# Patient Record
Sex: MALE | Race: WHITE | URBAN - METROPOLITAN AREA
[De-identification: names, ages, dates, MRNs, and addresses within clinical notes are randomized per-mention and may not be internally consistent; named-entity substitution may affect disease eponyms.]

---

## 2017-02-17 ENCOUNTER — OFFICE VISIT (OUTPATIENT)
Dept: URGENT CARE | Facility: URGENT CARE | Age: 77
End: 2017-02-17
Payer: MEDICARE

## 2017-02-17 VITALS
OXYGEN SATURATION: 96 % | SYSTOLIC BLOOD PRESSURE: 106 MMHG | WEIGHT: 164 LBS | DIASTOLIC BLOOD PRESSURE: 66 MMHG | TEMPERATURE: 97.8 F | BODY MASS INDEX: 23.53 KG/M2 | HEART RATE: 80 BPM

## 2017-02-17 DIAGNOSIS — R05.9 COUGH: Primary | ICD-10-CM

## 2017-02-17 DIAGNOSIS — J10.1 INFLUENZA DUE TO INFLUENZA A VIRUS: ICD-10-CM

## 2017-02-17 LAB
FLUAV+FLUBV AG SPEC QL: ABNORMAL
FLUAV+FLUBV AG SPEC QL: ABNORMAL
SPECIMEN SOURCE: ABNORMAL

## 2017-02-17 PROCEDURE — 87804 INFLUENZA ASSAY W/OPTIC: CPT | Performed by: FAMILY MEDICINE

## 2017-02-17 PROCEDURE — 99213 OFFICE O/P EST LOW 20 MIN: CPT | Performed by: FAMILY MEDICINE

## 2017-02-17 RX ORDER — OSELTAMIVIR PHOSPHATE 75 MG/1
75 CAPSULE ORAL 2 TIMES DAILY
Qty: 10 CAPSULE | Refills: 0 | Status: SHIPPED | OUTPATIENT
Start: 2017-02-17 | End: 2017-05-22

## 2017-02-17 NOTE — NURSING NOTE
"Chief Complaint   Patient presents with     URI     x4 days.       Initial /66 (BP Location: Right arm, Patient Position: Chair, Cuff Size: Adult Large)  Pulse 80  Temp 97.8  F (36.6  C) (Oral)  Wt 164 lb (74.4 kg)  SpO2 96%  BMI 23.53 kg/m2 Estimated body mass index is 23.53 kg/(m^2) as calculated from the following:    Height as of 5/14/16: 5' 10\" (1.778 m).    Weight as of this encounter: 164 lb (74.4 kg).  Medication Reconciliation: complete   Radha Bello CMA      "

## 2017-02-17 NOTE — MR AVS SNAPSHOT
After Visit Summary   2/17/2017    Jose Wynne    MRN: [id number]           Patient Information     Date Of Birth          1940        Visit Information        Provider Department      2/17/2017 5:05 PM Mikel Salgado MD [facility]        Today's Diagnoses     Cough    -  1    Influenza due to influenza A virus          Care Instructions      Influenza  Influenza ( the flu ) is an infection that affects your respiratory tract (the mouth, nose, and lungs, and the passages between them). Unlike a cold, the flu can make you very ill. And it can lead to pneumonia, a serious lung infection. For some people, especially older adults, young children, and people with certain chronic conditions, the flu can have serious complications and even be fatal.  What Are the Risk Factors for the Flu?     Viruses that cause influenza spread through the air in droplets when someone who has the flu coughs, sneezes, laughs, or talks.   Anyone can get the flu. But you re more likely to become infected if you:    Have a weakened immune system.    Work in a health care setting where you may be exposed to flu germs.    Live or work with someone who has the flu.    Haven t received an annual flu shot.  How Does the Flu Spread?  The flu is caused by viruses. The viruses spread through the air in droplets when someone who has the flu coughs, sneezes, laughs, or talks. You can become infected when you inhale these viruses directly. You can also become infected when you touch a surface on which the droplets have landed and then transfer the germs to your eyes, nose, or mouth. Touching used tissues, or sharing utensils, drinking glasses, or a toothbrush with an infected person can expose you to flu viruses, too.  What Are the Symptoms of the Flu?  Flu symptoms tend to come on quickly and may last a few days to a few weeks. They include:    Fever usually higher than 101 F  (38.3 C) and chills    Sore throat and  headache    Dry cough    Runny nose    Tiredness and weakness    Muscle aches  Factors That Can Make Flu Worse  For some people, the flu can be very serious. The risk of complications is greater for:    Children under age 5.    Adults 65 years of age and older.    People with a chronic illness, such as diabetes or heart, kidney, or lung disease.    People who live in a nursing home or long-term care facility.   How Is the Flu Treated?  Influenza usually improves after 7 days or so. In some cases, your health care provider may prescribe an antiviral medication. This may help you get well sooner. For the medication to help, you need to take it as soon as possible (ideally within 48 hours) after your symptoms start. If you develop pneumonia or other serious illness, hospital care may be needed.  Easing Flu Symptoms    Drink lots of fluids such as water, juice, and warm soup. A good rule is to drink enough so that you urinate your normal amount.    Get plenty of rest.    Ask your health care provider what to take for fever and pain.    Call your provider if your fever rises over 101 F (38.3 C) or you become dizzy, lightheaded, or short of breath.  Taking Steps to Protect Others    Wash your hands often, especially after coughing or sneezing. Or, clean your hands with an alcohol-based hand  containing at least 60 percent alcohol.    Cough or sneeze into a tissue. Then throw the tissue away and wash your hands. If you don t have a tissue, cough and sneeze into the crook of your elbow.    Stay home until at least 24 hours after you no longer have a fever or chills. Be sure the fever isn t being hidden by fever-reducing medication.    Don t share food, utensils, drinking glasses, or a toothbrush with others.    Ask your health care provider if others in your household should receive antiviral medication to help them avoid infection.  How Can the Flu Be Prevented?    One of the best ways to avoid the flu is to get a  flu vaccination each year. Viruses that cause the flu change from year to year. For that reason, doctors recommend getting the flu vaccine each year, as soon as it's available in your area. The vaccine may be given as a shot or as a nasal spray. Your health care provider can tell you which vaccine is right for you.    Wash your hands often. Frequent handwashing is a proven way to help prevent infection.    Carry an alcohol-based hand gel containing at least 60 percent alcohol. Use it when you don t have access to soap and water. Then wash your hands as soon as you can.    Avoid touching your eyes, nose, and mouth.    At home and work, clean phones, computer keyboards, and toys often with disinfectant wipes.    If possible, avoid close contact with others who have the flu or symptoms of the flu.  Handwashing Tips  Handwashing is one of the best ways to prevent many common infections. If you re caring for or visiting someone with the flu, wash your hands each time you enter and leave the room. Follow these steps:    Use warm water and plenty of soap. Rub your hands together well.    Clean the whole hand, under your nails, between your fingers, and up the wrists.    Wash for at least 15 seconds.    Rinse, letting the water run down your fingers, not up your wrists.    Dry your hands well. Use a paper towel to turn off the faucet and open the door.  Using Alcohol-Based Hand   Alcohol-based hand  are also a good choice. Use them when you don t have access to soap and water. Follow these steps:    Squeeze about a tablespoon of gel into the palm of one hand.    Rub your hands together briskly, cleaning the backs of your hands, the palms, between your fingers, and up the wrists.    Rub until the gel is gone and your hands are completely dry.  Preventing Influenza in Healthcare Settings  The flu is a special concern for people in hospitals and long-term care facilities. To help prevent the spread of flu, many  hospitals and nursing homes take these steps:    Health care providers wash their hands or use an alcohol-based hand  before and after treating each patient.    People with the flu have private rooms and bathrooms or share a room with someone with the same infection.    High-risk patients who don t have the flu are encouraged to get the flu and pneumonia vaccines.    All health care workers are encouraged or required to get flu shots.        0457-4348 [employer]. [address] [place] [address], [place] [address]. All rights reserved. This information is not intended as a substitute for professional medical care. Always follow your healthcare professional's instructions.        Patient Education    Oseltamivir Phosphate Oral capsule    Oseltamivir Phosphate Oral suspension  Oseltamivir Phosphate Oral capsule  What is this medicine?  OSELTAMIVIR (os el PLAZA i vir) is an antiviral medicine. It is used to prevent and to treat some kinds of influenza or the flu. It will not work for colds or other viral infections.  This medicine may be used for other purposes; ask your health care provider or pharmacist if you have questions.  What should I tell my health care provider before I take this medicine?  They need to know if you have any of the following conditions:    heart disease    immune system problems    kidney disease    liver disease    lung disease    an unusual or allergic reaction to oseltamivir, other medicines, foods, dyes, or preservatives    pregnant or trying to get pregnant    breast-feeding  How should I use this medicine?  Take this medicine by mouth with a glass of water. Follow the directions on the prescription label. Start this medicine at the first sign of flu symptoms. You can take it with or without food. If it upsets your stomach, take it with food. Take your medicine at regular intervals. Do not take your medicine more often than directed. Take all of your medicine as directed even if  you think you are better. Do not skip doses or stop your medicine early.  Talk to your pediatrician regarding the use of this medicine in children. While this drug may be prescribed for children as young as 14 days for selected conditions, precautions do apply.  Overdosage: If you think you have taken too much of this medicine contact a poison control center or emergency room at once.  NOTE: This medicine is only for you. Do not share this medicine with others.  What if I miss a dose?  If you miss a dose, take it as soon as you remember. If it is almost time for your next dose (within 2 hours), take only that dose. Do not take double or extra doses.  What may interact with this medicine?  Interactions are not expected.  This list may not describe all possible interactions. Give your health care provider a list of all the medicines, herbs, non-prescription drugs, or dietary supplements you use. Also tell them if you smoke, drink alcohol, or use illegal drugs. Some items may interact with your medicine.  What should I watch for while using this medicine?  Visit your doctor or health care professional for regular check ups. Tell your doctor if your symptoms do not start to get better or if they get worse.  If you have the flu, you may be at an increased risk of developing seizures, confusion, or abnormal behavior. This occurs early in the illness, and more frequently in children and teens. These events are not common, but may result in accidental injury to the patient. Families and caregivers of patients should watch for signs of unusual behavior and contact a doctor or health care professional right away if the patient shows signs of unusual behavior.  This medicine is not a substitute for the flu shot. Talk to your doctor each year about an annual flu shot.  What side effects may I notice from receiving this medicine?  Side effects that you should report to your doctor or health care professional as soon as  possible:    allergic reactions like skin rash, itching or hives, swelling of the face, lips, or tongue    anxiety, confusion, unusual behavior    breathing problems    hallucination, loss of contact with reality    redness, blistering, peeling or loosening of the skin, including inside the mouth    seizures  Side effects that usually do not require medical attention (report to your doctor or health care professional if they continue or are bothersome):    cough    diarrhea    dizziness    headache    nausea, vomiting    stomach pain  This list may not describe all possible side effects. Call your doctor for medical advice about side effects. You may report side effects to FDA at 9-288-JBP-4481.  Where should I keep my medicine?  Keep out of the reach of children.  Store at room temperature between 15 and 30 degrees C (59 and 86 degrees F). Throw away any unused medicine after the expiration date.  NOTE:This sheet is a summary. It may not cover all possible information. If you have questions about this medicine, talk to your doctor, pharmacist, or health care provider. Copyright  2016 [employer]              Follow-ups after your visit        Who to contact     If you have questions or need follow up information about today's clinic visit or your schedule please contact [facility] directly at [phone].  Normal or non-critical lab and imaging results will be communicated to you by MyChart, letter or phone within 4 business days after the clinic has received the results. If you do not hear from us within 7 days, please contact the clinic through MyChart or phone. If you have a critical or abnormal lab result, we will notify you by phone as soon as possible.  Submit refill requests through [employer] or call your pharmacy and they will forward the refill request to us. Please allow 3 business days for your refill to be completed.          Additional Information About Your Visit        [employer]  "Information     [employer] lets you send messages to your doctor, view your test results, renew your prescriptions, schedule appointments and more. To sign up, go to [email or web address] . Click on \"Log in\" on the left side of the screen, which will take you to the Welcome page. Then click on \"Sign up Now\" on the right side of the page.     You will be asked to enter the access code listed below, as well as some personal information. Please follow the directions to create your username and password.     Your access code is: [email or web address]  Expires: 2017  6:00 PM     Your access code will  in 90 days. If you need help or a new code, please call your [facility] clinic or [phone].        Care EveryWhere ID     This is your Care EveryWhere ID. This could be used by other organizations to access your [facility] medical records  [id number]        Your Vitals Were     Pulse Temperature Pulse Oximetry BMI (Body Mass Index)          80 97.8  F (36.6  C) (Oral) 96% 23.53 kg/m2         Blood Pressure from Last 3 Encounters:   17 106/66   10/26/16 124/70   16 136/73    Weight from Last 3 Encounters:   17 164 lb (74.4 kg)   10/26/16 172 lb 6 oz (78.2 kg)   16 166 lb (75.3 kg)              We Performed the Following     Influenza A/B antigen          Today's Medication Changes          These changes are accurate as of: 17  6:01 PM.  If you have any questions, ask your nurse or doctor.               Start taking these medicines.        Dose/Directions    oseltamivir 75 MG capsule   Commonly known as:  TAMIFLU   Used for:  Influenza due to influenza A virus   Started by:  Mikel Salgado MD        Dose:  75 mg   Take 1 capsule (75 mg) by mouth 2 times daily   Quantity:  10 capsule   Refills:  0            Where to get your medicines      These medications were sent to [place] [address] [place], [place] -  [facility]   [facility] [address]     Phone:  " [phone]     oseltamivir 75 MG capsule                Primary Care Provider Office Phone # Fax #    [facility] [phone] [address] [address] [facility] [address]        Thank you!     Thank you for choosing [facility]  for your care. Our goal is always to provide you with excellent care. Hearing back from our patients is one way we can continue to improve our services. Please take a few minutes to complete the written survey that you may receive in the mail after your visit with us. Thank you!             Your Updated Medication List - Protect others around you: Learn how to safely use, store and throw away your medicines at www.disposemymeds.org.          This list is accurate as of: 2/17/17  6:01 PM.  Always use your most recent med list.                   Brand Name Dispense Instructions for use    aspirin 81 MG tablet     90 tablet    Take 1 tablet by mouth daily.       losartan 100 MG tablet    COZAAR     Take 100 mg by mouth daily       metoprolol 25 MG 24 hr tablet    TOPROL-XL    45 tablet    Take 0.5 tablets by mouth daily.       oseltamivir 75 MG capsule    TAMIFLU    10 capsule    Take 1 capsule (75 mg) by mouth 2 times daily       simvastatin 10 MG tablet    ZOCOR    90 tablet    Take 1 tablet by mouth At Bedtime.

## 2017-02-17 NOTE — PROGRESS NOTES
SUBJECTIVE:                                                    Jose Wynne is a 76 year old male who presents to clinic today for the following health issues:      RESPIRATORY SYMPTOMS      Duration: 4 days    Description  Cough (non-productive), chills and headache    Severity: mild    Accompanying signs and symptoms: None    History (predisposing factors):  none    Precipitating or alleviating factors: None    Therapies tried and outcome:  rest and fluids aspirin       Problem list and histories reviewed & adjusted, as indicated.  Additional history: as documented    Patient Active Problem List   Diagnosis     Advanced directives, counseling/discussion     Frequent PVCs     Hypertension goal BP (blood pressure) < 140/90     Hyperlipidemia LDL goal <160     HL (hearing loss)     CAD (coronary artery disease)     Common wart     Past Surgical History   Procedure Laterality Date     C open rx ankle dislocatn+fixatn       Herniorrhaphy inguinal  2008       Social History   Substance Use Topics     Smoking status: Never Smoker     Smokeless tobacco: Never Used      Comment: never smoker; non-smoking household     Alcohol use No      Comment: none     Family History   Problem Relation Age of Onset     Breast Cancer Mother      CANCER Sister      liver and stomach     HEART DISEASE No family hx of      Lipids No family hx of      DIABETES No family hx of      Hypertension No family hx of      Prostate Cancer No family hx of      Neurologic Disorder Son      epilepsy-controlled with medication         Current Outpatient Prescriptions   Medication Sig Dispense Refill     losartan (COZAAR) 100 MG tablet Take 100 mg by mouth daily       simvastatin (ZOCOR) 10 MG tablet Take 1 tablet by mouth At Bedtime. 90 tablet 1     metoprolol (TOPROL-XL) 25 MG 24 hr tablet Take 0.5 tablets by mouth daily. 45 tablet 1     aspirin 81 MG tablet Take 1 tablet by mouth daily. 90 tablet 3     No Known Allergies  Recent Labs   Lab Test  07/19/13 03/22/13   0943  12/05/12   0959  10/29/12   1004   LDL  78  104  157*   --    HDL  32  35*  36*   --    TRIG  102  61  102   --    ALT  21   --    --    --    CR  0.9   --   0.86  0.82   GFRESTIMATED  >60   --   87  >90   GFRESTBLACK   --    --   >90  >90   POTASSIUM  4.2   --   4.2  4.2   TSH   --    --    --   1.75      BP Readings from Last 3 Encounters:   02/17/17 106/66   10/26/16 124/70   05/14/16 136/73    Wt Readings from Last 3 Encounters:   02/17/17 164 lb (74.4 kg)   10/26/16 172 lb 6 oz (78.2 kg)   05/14/16 166 lb (75.3 kg)                  Labs reviewed in EPIC  Problem list, Medication list, Allergies, and Medical/Social/Surgical histories reviewed in [facility] and updated as appropriate.    ROS:  Constitutional, HEENT, cardiovascular, pulmonary, gi and gu systems are negative, except as otherwise noted.    OBJECTIVE:                                                    /66 (BP Location: Right arm, Patient Position: Chair, Cuff Size: Adult Large)  Pulse 80  Temp 97.8  F (36.6  C) (Oral)  Wt 164 lb (74.4 kg)  SpO2 96%  BMI 23.53 kg/m2  Body mass index is 23.53 kg/(m^2).  GENERAL: healthy, alert and no distress  EYES: Eyes grossly normal to inspection, PERRL and conjunctivae and sclerae normal  EYES: Eyes grossly normal to inspection  NECK: no adenopathy, no asymmetry, masses, or scars and thyroid normal to palpation  RESP: lungs clear to auscultation - no rales, rhonchi or wheezes  CV: regular rate and rhythm, normal S1 S2, no S3 or S4, no murmur, click or rub, no peripheral edema and peripheral pulses strong  ABDOMEN: soft, nontender, no hepatosplenomegaly, no masses and bowel sounds normal  MS: no gross musculoskeletal defects noted, no edema    Diagnostic Test Results:  Results for orders placed or performed in visit on 02/17/17 (from the past 24 hour(s))   Influenza A/B antigen   Result Value Ref Range    Influenza A/B Agn Specimen Nasal     Influenza A (A) NEG     Positive   Test results  must be correlated with clinical data. If necessary, results   should be confirmed by a molecular assay or viral culture.      Influenza B  NEG     Negative   Test results must be correlated with clinical data. If necessary, results   should be confirmed by a molecular assay or viral culture.          ASSESSMENT/PLAN:                                                          ICD-10-CM    1. Cough R05 Influenza A/B antigen   2. Influenza due to influenza A virus J10.1 oseltamivir (TAMIFLU) 75 MG capsule       Discussed in detail differentials and further management. Symptoms are likely secondary to influenza A. Tamiflu prescribed in view of age, common side effects discussed. Recommended well hydration, over-the-counter analgesia, warm fluids and saline gargles. Patient understood and in agreement with the above plan. All questions are answered.      MEDICATIONS:   Orders Placed This Encounter   Medications     oseltamivir (TAMIFLU) 75 MG capsule     Sig: Take 1 capsule (75 mg) by mouth 2 times daily     Dispense:  10 capsule     Refill:  0     Patient Instructions       Influenza  Influenza ( the flu ) is an infection that affects your respiratory tract (the mouth, nose, and lungs, and the passages between them). Unlike a cold, the flu can make you very ill. And it can lead to pneumonia, a serious lung infection. For some people, especially older adults, young children, and people with certain chronic conditions, the flu can have serious complications and even be fatal.  What Are the Risk Factors for the Flu?     Viruses that cause influenza spread through the air in droplets when someone who has the flu coughs, sneezes, laughs, or talks.   Anyone can get the flu. But you re more likely to become infected if you:    Have a weakened immune system.    Work in a health care setting where you may be exposed to flu germs.    Live or work with someone who has the flu.    Haven t received an annual flu shot.  How Does the  Flu Spread?  The flu is caused by viruses. The viruses spread through the air in droplets when someone who has the flu coughs, sneezes, laughs, or talks. You can become infected when you inhale these viruses directly. You can also become infected when you touch a surface on which the droplets have landed and then transfer the germs to your eyes, nose, or mouth. Touching used tissues, or sharing utensils, drinking glasses, or a toothbrush with an infected person can expose you to flu viruses, too.  What Are the Symptoms of the Flu?  Flu symptoms tend to come on quickly and may last a few days to a few weeks. They include:    Fever usually higher than 101 F  (38.3 C) and chills    Sore throat and headache    Dry cough    Runny nose    Tiredness and weakness    Muscle aches  Factors That Can Make Flu Worse  For some people, the flu can be very serious. The risk of complications is greater for:    Children under age 5.    Adults 65 years of age and older.    People with a chronic illness, such as diabetes or heart, kidney, or lung disease.    People who live in a nursing home or long-term care facility.   How Is the Flu Treated?  Influenza usually improves after 7 days or so. In some cases, your health care provider may prescribe an antiviral medication. This may help you get well sooner. For the medication to help, you need to take it as soon as possible (ideally within 48 hours) after your symptoms start. If you develop pneumonia or other serious illness, hospital care may be needed.  Easing Flu Symptoms    Drink lots of fluids such as water, juice, and warm soup. A good rule is to drink enough so that you urinate your normal amount.    Get plenty of rest.    Ask your health care provider what to take for fever and pain.    Call your provider if your fever rises over 101 F (38.3 C) or you become dizzy, lightheaded, or short of breath.  Taking Steps to Protect Others    Wash your hands often, especially after coughing  or sneezing. Or, clean your hands with an alcohol-based hand  containing at least 60 percent alcohol.    Cough or sneeze into a tissue. Then throw the tissue away and wash your hands. If you don t have a tissue, cough and sneeze into the crook of your elbow.    Stay home until at least 24 hours after you no longer have a fever or chills. Be sure the fever isn t being hidden by fever-reducing medication.    Don t share food, utensils, drinking glasses, or a toothbrush with others.    Ask your health care provider if others in your household should receive antiviral medication to help them avoid infection.  How Can the Flu Be Prevented?    One of the best ways to avoid the flu is to get a flu vaccination each year. Viruses that cause the flu change from year to year. For that reason, doctors recommend getting the flu vaccine each year, as soon as it's available in your area. The vaccine may be given as a shot or as a nasal spray. Your health care provider can tell you which vaccine is right for you.    Wash your hands often. Frequent handwashing is a proven way to help prevent infection.    Carry an alcohol-based hand gel containing at least 60 percent alcohol. Use it when you don t have access to soap and water. Then wash your hands as soon as you can.    Avoid touching your eyes, nose, and mouth.    At home and work, clean phones, computer keyboards, and toys often with disinfectant wipes.    If possible, avoid close contact with others who have the flu or symptoms of the flu.  Handwashing Tips  Handwashing is one of the best ways to prevent many common infections. If you re caring for or visiting someone with the flu, wash your hands each time you enter and leave the room. Follow these steps:    Use warm water and plenty of soap. Rub your hands together well.    Clean the whole hand, under your nails, between your fingers, and up the wrists.    Wash for at least 15 seconds.    Rinse, letting the water run  down your fingers, not up your wrists.    Dry your hands well. Use a paper towel to turn off the faucet and open the door.  Using Alcohol-Based Hand   Alcohol-based hand  are also a good choice. Use them when you don t have access to soap and water. Follow these steps:    Squeeze about a tablespoon of gel into the palm of one hand.    Rub your hands together briskly, cleaning the backs of your hands, the palms, between your fingers, and up the wrists.    Rub until the gel is gone and your hands are completely dry.  Preventing Influenza in Healthcare Settings  The flu is a special concern for people in hospitals and long-term care facilities. To help prevent the spread of flu, many hospitals and nursing homes take these steps:    Health care providers wash their hands or use an alcohol-based hand  before and after treating each patient.    People with the flu have private rooms and bathrooms or share a room with someone with the same infection.    High-risk patients who don t have the flu are encouraged to get the flu and pneumonia vaccines.    All health care workers are encouraged or required to get flu shots.        7534-9491 [employer]. [address] [place] [address]. All rights reserved. This information is not intended as a substitute for professional medical care. Always follow your healthcare professional's instructions.        Patient Education    Oseltamivir Phosphate Oral capsule    Oseltamivir Phosphate Oral suspension  Oseltamivir Phosphate Oral capsule  What is this medicine?  OSELTAMIVIR (os el PLAZA i vir) is an antiviral medicine. It is used to prevent and to treat some kinds of influenza or the flu. It will not work for colds or other viral infections.  This medicine may be used for other purposes; ask your health care provider or pharmacist if you have questions.  What should I tell my health care provider before I take this medicine?  They need to  know if you have any of the following conditions:    heart disease    immune system problems    kidney disease    liver disease    lung disease    an unusual or allergic reaction to oseltamivir, other medicines, foods, dyes, or preservatives    pregnant or trying to get pregnant    breast-feeding  How should I use this medicine?  Take this medicine by mouth with a glass of water. Follow the directions on the prescription label. Start this medicine at the first sign of flu symptoms. You can take it with or without food. If it upsets your stomach, take it with food. Take your medicine at regular intervals. Do not take your medicine more often than directed. Take all of your medicine as directed even if you think you are better. Do not skip doses or stop your medicine early.  Talk to your pediatrician regarding the use of this medicine in children. While this drug may be prescribed for children as young as 14 days for selected conditions, precautions do apply.  Overdosage: If you think you have taken too much of this medicine contact a poison control center or emergency room at once.  NOTE: This medicine is only for you. Do not share this medicine with others.  What if I miss a dose?  If you miss a dose, take it as soon as you remember. If it is almost time for your next dose (within 2 hours), take only that dose. Do not take double or extra doses.  What may interact with this medicine?  Interactions are not expected.  This list may not describe all possible interactions. Give your health care provider a list of all the medicines, herbs, non-prescription drugs, or dietary supplements you use. Also tell them if you smoke, drink alcohol, or use illegal drugs. Some items may interact with your medicine.  What should I watch for while using this medicine?  Visit your doctor or health care professional for regular check ups. Tell your doctor if your symptoms do not start to get better or if they get worse.  If you have the  flu, you may be at an increased risk of developing seizures, confusion, or abnormal behavior. This occurs early in the illness, and more frequently in children and teens. These events are not common, but may result in accidental injury to the patient. Families and caregivers of patients should watch for signs of unusual behavior and contact a doctor or health care professional right away if the patient shows signs of unusual behavior.  This medicine is not a substitute for the flu shot. Talk to your doctor each year about an annual flu shot.  What side effects may I notice from receiving this medicine?  Side effects that you should report to your doctor or health care professional as soon as possible:    allergic reactions like skin rash, itching or hives, swelling of the face, lips, or tongue    anxiety, confusion, unusual behavior    breathing problems    hallucination, loss of contact with reality    redness, blistering, peeling or loosening of the skin, including inside the mouth    seizures  Side effects that usually do not require medical attention (report to your doctor or health care professional if they continue or are bothersome):    cough    diarrhea    dizziness    headache    nausea, vomiting    stomach pain  This list may not describe all possible side effects. Call your doctor for medical advice about side effects. You may report side effects to FDA at 8-293-FDA-5249.  Where should I keep my medicine?  Keep out of the reach of children.  Store at room temperature between 15 and 30 degrees C (59 and 86 degrees F). Throw away any unused medicine after the expiration date.  NOTE:This sheet is a summary. It may not cover all possible information. If you have questions about this medicine, talk to your doctor, pharmacist, or health care provider. Copyright  2016 [employer]          Mikel Salgado MD  [facility]

## 2017-02-18 NOTE — PATIENT INSTRUCTIONS
Influenza  Influenza ( the flu ) is an infection that affects your respiratory tract (the mouth, nose, and lungs, and the passages between them). Unlike a cold, the flu can make you very ill. And it can lead to pneumonia, a serious lung infection. For some people, especially older adults, young children, and people with certain chronic conditions, the flu can have serious complications and even be fatal.  What Are the Risk Factors for the Flu?     Viruses that cause influenza spread through the air in droplets when someone who has the flu coughs, sneezes, laughs, or talks.   Anyone can get the flu. But you re more likely to become infected if you:    Have a weakened immune system.    Work in a health care setting where you may be exposed to flu germs.    Live or work with someone who has the flu.    Haven t received an annual flu shot.  How Does the Flu Spread?  The flu is caused by viruses. The viruses spread through the air in droplets when someone who has the flu coughs, sneezes, laughs, or talks. You can become infected when you inhale these viruses directly. You can also become infected when you touch a surface on which the droplets have landed and then transfer the germs to your eyes, nose, or mouth. Touching used tissues, or sharing utensils, drinking glasses, or a toothbrush with an infected person can expose you to flu viruses, too.  What Are the Symptoms of the Flu?  Flu symptoms tend to come on quickly and may last a few days to a few weeks. They include:    Fever usually higher than 101 F  (38.3 C) and chills    Sore throat and headache    Dry cough    Runny nose    Tiredness and weakness    Muscle aches  Factors That Can Make Flu Worse  For some people, the flu can be very serious. The risk of complications is greater for:    Children under age 5.    Adults 65 years of age and older.    People with a chronic illness, such as diabetes or heart, kidney, or lung disease.    People who live in a nursing  home or long-term care facility.   How Is the Flu Treated?  Influenza usually improves after 7 days or so. In some cases, your health care provider may prescribe an antiviral medication. This may help you get well sooner. For the medication to help, you need to take it as soon as possible (ideally within 48 hours) after your symptoms start. If you develop pneumonia or other serious illness, hospital care may be needed.  Easing Flu Symptoms    Drink lots of fluids such as water, juice, and warm soup. A good rule is to drink enough so that you urinate your normal amount.    Get plenty of rest.    Ask your health care provider what to take for fever and pain.    Call your provider if your fever rises over 101 F (38.3 C) or you become dizzy, lightheaded, or short of breath.  Taking Steps to Protect Others    Wash your hands often, especially after coughing or sneezing. Or, clean your hands with an alcohol-based hand  containing at least 60 percent alcohol.    Cough or sneeze into a tissue. Then throw the tissue away and wash your hands. If you don t have a tissue, cough and sneeze into the crook of your elbow.    Stay home until at least 24 hours after you no longer have a fever or chills. Be sure the fever isn t being hidden by fever-reducing medication.    Don t share food, utensils, drinking glasses, or a toothbrush with others.    Ask your health care provider if others in your household should receive antiviral medication to help them avoid infection.  How Can the Flu Be Prevented?    One of the best ways to avoid the flu is to get a flu vaccination each year. Viruses that cause the flu change from year to year. For that reason, doctors recommend getting the flu vaccine each year, as soon as it's available in your area. The vaccine may be given as a shot or as a nasal spray. Your health care provider can tell you which vaccine is right for you.    Wash your hands often. Frequent handwashing is a proven way  to help prevent infection.    Carry an alcohol-based hand gel containing at least 60 percent alcohol. Use it when you don t have access to soap and water. Then wash your hands as soon as you can.    Avoid touching your eyes, nose, and mouth.    At home and work, clean phones, computer keyboards, and toys often with disinfectant wipes.    If possible, avoid close contact with others who have the flu or symptoms of the flu.  Handwashing Tips  Handwashing is one of the best ways to prevent many common infections. If you re caring for or visiting someone with the flu, wash your hands each time you enter and leave the room. Follow these steps:    Use warm water and plenty of soap. Rub your hands together well.    Clean the whole hand, under your nails, between your fingers, and up the wrists.    Wash for at least 15 seconds.    Rinse, letting the water run down your fingers, not up your wrists.    Dry your hands well. Use a paper towel to turn off the faucet and open the door.  Using Alcohol-Based Hand   Alcohol-based hand  are also a good choice. Use them when you don t have access to soap and water. Follow these steps:    Squeeze about a tablespoon of gel into the palm of one hand.    Rub your hands together briskly, cleaning the backs of your hands, the palms, between your fingers, and up the wrists.    Rub until the gel is gone and your hands are completely dry.  Preventing Influenza in Healthcare Settings  The flu is a special concern for people in hospitals and long-term care facilities. To help prevent the spread of flu, many hospitals and nursing homes take these steps:    Health care providers wash their hands or use an alcohol-based hand  before and after treating each patient.    People with the flu have private rooms and bathrooms or share a room with someone with the same infection.    High-risk patients who don t have the flu are encouraged to get the flu and pneumonia  vaccines.    All health care workers are encouraged or required to get flu shots.        1664-3195 [employer]. [address] [place] [address], [place] [address]. All rights reserved. This information is not intended as a substitute for professional medical care. Always follow your healthcare professional's instructions.        Patient Education    Oseltamivir Phosphate Oral capsule    Oseltamivir Phosphate Oral suspension  Oseltamivir Phosphate Oral capsule  What is this medicine?  OSELTAMIVIR (os el PLAZA i vir) is an antiviral medicine. It is used to prevent and to treat some kinds of influenza or the flu. It will not work for colds or other viral infections.  This medicine may be used for other purposes; ask your health care provider or pharmacist if you have questions.  What should I tell my health care provider before I take this medicine?  They need to know if you have any of the following conditions:    heart disease    immune system problems    kidney disease    liver disease    lung disease    an unusual or allergic reaction to oseltamivir, other medicines, foods, dyes, or preservatives    pregnant or trying to get pregnant    breast-feeding  How should I use this medicine?  Take this medicine by mouth with a glass of water. Follow the directions on the prescription label. Start this medicine at the first sign of flu symptoms. You can take it with or without food. If it upsets your stomach, take it with food. Take your medicine at regular intervals. Do not take your medicine more often than directed. Take all of your medicine as directed even if you think you are better. Do not skip doses or stop your medicine early.  Talk to your pediatrician regarding the use of this medicine in children. While this drug may be prescribed for children as young as 14 days for selected conditions, precautions do apply.  Overdosage: If you think you have taken too much of this medicine contact a poison control center or  emergency room at once.  NOTE: This medicine is only for you. Do not share this medicine with others.  What if I miss a dose?  If you miss a dose, take it as soon as you remember. If it is almost time for your next dose (within 2 hours), take only that dose. Do not take double or extra doses.  What may interact with this medicine?  Interactions are not expected.  This list may not describe all possible interactions. Give your health care provider a list of all the medicines, herbs, non-prescription drugs, or dietary supplements you use. Also tell them if you smoke, drink alcohol, or use illegal drugs. Some items may interact with your medicine.  What should I watch for while using this medicine?  Visit your doctor or health care professional for regular check ups. Tell your doctor if your symptoms do not start to get better or if they get worse.  If you have the flu, you may be at an increased risk of developing seizures, confusion, or abnormal behavior. This occurs early in the illness, and more frequently in children and teens. These events are not common, but may result in accidental injury to the patient. Families and caregivers of patients should watch for signs of unusual behavior and contact a doctor or health care professional right away if the patient shows signs of unusual behavior.  This medicine is not a substitute for the flu shot. Talk to your doctor each year about an annual flu shot.  What side effects may I notice from receiving this medicine?  Side effects that you should report to your doctor or health care professional as soon as possible:    allergic reactions like skin rash, itching or hives, swelling of the face, lips, or tongue    anxiety, confusion, unusual behavior    breathing problems    hallucination, loss of contact with reality    redness, blistering, peeling or loosening of the skin, including inside the mouth    seizures  Side effects that usually do not require medical attention  (report to your doctor or health care professional if they continue or are bothersome):    cough    diarrhea    dizziness    headache    nausea, vomiting    stomach pain  This list may not describe all possible side effects. Call your doctor for medical advice about side effects. You may report side effects to FDA at 2-548-IJO-1619.  Where should I keep my medicine?  Keep out of the reach of children.  Store at room temperature between 15 and 30 degrees C (59 and 86 degrees F). Throw away any unused medicine after the expiration date.  NOTE:This sheet is a summary. It may not cover all possible information. If you have questions about this medicine, talk to your doctor, pharmacist, or health care provider. Copyright  2016 [employer]

## 2017-05-22 ENCOUNTER — OFFICE VISIT (OUTPATIENT)
Dept: URGENT CARE | Facility: URGENT CARE | Age: 77
End: 2017-05-22
Payer: MEDICARE

## 2017-05-22 VITALS — OXYGEN SATURATION: 98 % | BODY MASS INDEX: 22.96 KG/M2 | TEMPERATURE: 97.7 F | WEIGHT: 160 LBS | HEART RATE: 65 BPM

## 2017-05-22 DIAGNOSIS — B37.2 CANDIDIASIS OF SKIN: Primary | ICD-10-CM

## 2017-05-22 PROCEDURE — 99213 OFFICE O/P EST LOW 20 MIN: CPT | Performed by: PHYSICIAN ASSISTANT

## 2017-05-22 RX ORDER — NYSTATIN 100000 [USP'U]/G
POWDER TOPICAL 2 TIMES DAILY
Qty: 60 G | Refills: 2 | Status: SHIPPED | OUTPATIENT
Start: 2017-05-22 | End: 2017-06-30

## 2017-05-22 NOTE — NURSING NOTE
"Chief Complaint   Patient presents with     Derm Problem     both legs X 1 month       Initial Pulse 65  Wt 160 lb (72.6 kg)  SpO2 98%  BMI 22.96 kg/m2 Estimated body mass index is 22.96 kg/(m^2) as calculated from the following:    Height as of 5/14/16: 5' 10\" (1.778 m).    Weight as of this encounter: 160 lb (72.6 kg).  Medication Reconciliation: suzanne Moss CMA      "

## 2017-05-22 NOTE — MR AVS SNAPSHOT
"              After Visit Summary   2017    Jose Wynne    MRN: [id number]           Patient Information     Date Of Birth          1940        Visit Information        Provider Department      2017 5:00 PM Fabiola Bernal PA-C [facility]        Today's Diagnoses     Candidiasis of skin    -  1       Follow-ups after your visit        Who to contact     If you have questions or need follow up information about today's clinic visit or your schedule please contact [facility] directly at [phone].  Normal or non-critical lab and imaging results will be communicated to you by [employer], letter or phone within 4 business days after the clinic has received the results. If you do not hear from us within 7 days, please contact the clinic through [employer] or phone. If you have a critical or abnormal lab result, we will notify you by phone as soon as possible.  Submit refill requests through [employer] or call your pharmacy and they will forward the refill request to us. Please allow 3 business days for your refill to be completed.          Additional Information About Your Visit        MyChart Information     [employer] lets you send messages to your doctor, view your test results, renew your prescriptions, schedule appointments and more. To sign up, go to [email or web address] . Click on \"Log in\" on the left side of the screen, which will take you to the Welcome page. Then click on \"Sign up Now\" on the right side of the page.     You will be asked to enter the access code listed below, as well as some personal information. Please follow the directions to create your username and password.     Your access code is: [email or web address]  Expires: 2017  5:45 PM     Your access code will  in 90 days. If you need help or a new code, please call your [facility] [place] or [phone].        Care EveryWhere ID     This is your Care EveryWhere ID. This could be used by other " organizations to access your [place] medical records  [id number]        Your Vitals Were     Pulse Temperature Pulse Oximetry BMI (Body Mass Index)          65 97.7  F (36.5  C) (Oral) 98% 22.96 kg/m2         Blood Pressure from Last 3 Encounters:   02/17/17 106/66   10/26/16 124/70   05/14/16 136/73    Weight from Last 3 Encounters:   05/22/17 160 lb (72.6 kg)   02/17/17 164 lb (74.4 kg)   10/26/16 172 lb 6 oz (78.2 kg)              Today, you had the following     No orders found for display         Today's Medication Changes          These changes are accurate as of: 5/22/17  5:45 PM.  If you have any questions, ask your nurse or doctor.               Start taking these medicines.        Dose/Directions    nystatin 962562 UNIT/GM Powd   Commonly known as:  MYCOSTATIN   Used for:  Candidiasis of skin        Apply topically 2 times daily Prevention of rash   Quantity:  60 g   Refills:  2            Where to get your medicines      These medications were sent to [facility] [address] [place], [place] [address] [facility] [address]     Phone:  [phone]     nystatin 258170 UNIT/GM Powd                Primary Care Provider Office Phone # Fax #    [facility] [phone] [address] [facility]. [place] [address]        Thank you!     Thank you for choosing [facility]  for your care. Our goal is always to provide you with excellent care. Hearing back from our patients is one way we can continue to improve our services. Please take a few minutes to complete the written survey that you may receive in the mail after your visit with us. Thank you!             Your Updated Medication List - Protect others around you: Learn how to safely use, store and throw away your medicines at www.disposemymeds.org.          This list is accurate as of: 5/22/17  5:45 PM.  Always use your most recent med list.                   Brand Name Dispense  Instructions for use    aspirin 81 MG tablet     90 tablet    Take 1 tablet by mouth daily.       losartan 100 MG tablet    COZAAR     Take 100 mg by mouth daily       metoprolol 25 MG 24 hr tablet    TOPROL-XL    45 tablet    Take 0.5 tablets by mouth daily.       nystatin [id number] UNIT/GM Powd    MYCOSTATIN    60 g    Apply topically 2 times daily Prevention of rash       simvastatin 10 MG tablet    ZOCOR    90 tablet    Take 1 tablet by mouth At Bedtime.

## 2017-05-22 NOTE — PROGRESS NOTES
CC: Rash     SUBJECTIVE:  Jose Wynne is a 76 year old male who presents to the clinic today for a rash.  Onset of rash was 3 weeks ago.  Rash is improving.    Location of the rash: groin and both thighs.  Quality/symptoms of rash: burning and redness   Symptoms are moderate and rash seems to be improving.  Treatments include Cornstarch, Polysporin     Previous history of a similar rash? No  New medications:No  New skin, hair or laundry products: Yes: thinks it is from soap  Recent exposure history: none known    Associated symptoms include: nothing.  Fever: No  Recent illness: No    He first noticed the rash with a sore in his left groin.    He feels like soap or sweat may have made it worse.    He is using Polysporin for the past week, which helps some but has not cleared completely.  Cornstarch helps as well, and he has used that for past two weeks.  The rash does not completely go away.  It does not itch.    He denies any new detergents or soaps.    Tick exposure: He had a few ticks this past week on his legs, but you found them the same day.  Some bit him, but the longest they would have been attached is 24 hours.  He denies tick exposure prior to the rash.    Denies poison ivy exposure.          Past Medical History:   Diagnosis Date     BPH (benign prostatic hypertrophy)      Hearing loss      Current Outpatient Prescriptions   Medication Sig Dispense Refill     oseltamivir (TAMIFLU) 75 MG capsule Take 1 capsule (75 mg) by mouth 2 times daily 10 capsule 0     losartan (COZAAR) 100 MG tablet Take 100 mg by mouth daily       simvastatin (ZOCOR) 10 MG tablet Take 1 tablet by mouth At Bedtime. 90 tablet 1     metoprolol (TOPROL-XL) 25 MG 24 hr tablet Take 0.5 tablets by mouth daily. 45 tablet 1     aspirin 81 MG tablet Take 1 tablet by mouth daily. 90 tablet 3     Social History   Substance Use Topics     Smoking status: Never Smoker     Smokeless tobacco: Never Used      Comment: never smoker; non-smoking  household     Alcohol use No      Comment: none       ROS:  As in HPI    EXAM:   Pulse 65  Temp 97.7  F (36.5  C) (Oral)  Wt 160 lb (72.6 kg)  SpO2 98%  BMI 22.96 kg/m2    GENERAL: alert, no acute distress.  Rash description: inner thigh folds and scrotum - red with well demarcated borders, satellite lesions on left inner thigh, no streaking, no open wounds, no drainage, peeling on left inner thigh   Distribution: localized    ASSESSMENT/PLAN:  1. Candidiasis of skin - groin and scrotum  - nystatin (MYCOSTATIN) 416045 UNIT/GM POWD; Apply topically 2 times daily Dispense: 60 g; Refill: 2  - keep the area dry  - use powder for one week   - if symptoms worsen return right away   - if symptoms stay the same, return in one week   - if symptoms improve, no need to return    Fabiola Bernal PA-C

## 2017-06-15 ENCOUNTER — OFFICE VISIT (OUTPATIENT)
Dept: URGENT CARE | Facility: URGENT CARE | Age: 77
End: 2017-06-15
Payer: COMMERCIAL

## 2017-06-15 VITALS
WEIGHT: 165 LBS | DIASTOLIC BLOOD PRESSURE: 70 MMHG | SYSTOLIC BLOOD PRESSURE: 122 MMHG | BODY MASS INDEX: 23.68 KG/M2 | OXYGEN SATURATION: 97 % | TEMPERATURE: 98.1 F | HEART RATE: 60 BPM

## 2017-06-15 DIAGNOSIS — L30.4 INTERTRIGO: Primary | ICD-10-CM

## 2017-06-15 DIAGNOSIS — I10 HYPERTENSION GOAL BP (BLOOD PRESSURE) < 140/90: ICD-10-CM

## 2017-06-15 PROCEDURE — 99213 OFFICE O/P EST LOW 20 MIN: CPT | Performed by: FAMILY MEDICINE

## 2017-06-15 RX ORDER — NYSTATIN 100000 [USP'U]/G
POWDER TOPICAL 2 TIMES DAILY
Qty: 1 BOTTLE | Refills: 3 | Status: SHIPPED | OUTPATIENT
Start: 2017-06-15

## 2017-06-15 RX ORDER — NYSTATIN 100000 U/G
CREAM TOPICAL 2 TIMES DAILY
Qty: 250 G | Refills: 3 | Status: SHIPPED | OUTPATIENT
Start: 2017-06-15 | End: 2017-06-29

## 2017-06-15 NOTE — PROGRESS NOTES
SUBJECTIVE:                                                    Jose Wynne is a 76 year old male who presents to clinic today for the following health issues:      Rash      Duration: 1 month or so    Description  Location: groin area  Itching: no    Intensity:  mild    Accompanying signs and symptoms: burning    History (similar episodes/previous evaluation): Yes, back in May 2017    Precipitating or alleviating factors:  New exposures:  None  Recent travel: no      Therapies tried and outcome: Nystatin 64551 powder     Accompanying Signs & Symptoms:  Fever: no   Body aches or joint pain: no   Sore throat symptoms: no   Recent cold symptoms: no     No chest pain or shortness of breath  No headache no blurring of vision       Problem list, Medication list, Allergies, and Medical/Social/Surgical histories reviewed in [facility] and updated as appropriate.    ROS:  Constitutional, HEENT, cardiovascular, pulmonary, gi and gu systems are negative, except as otherwise noted.    OBJECTIVE:                                                    /70  Pulse 60  Temp 98.1  F (36.7  C) (Tympanic)  Wt 165 lb (74.8 kg)  SpO2 97%  BMI 23.68 kg/m2  Body mass index is 23.68 kg/(m^2).  GENERAL: healthy, alert and no distress  Neurologic: Cranial nerves intact no gross neurological deficits  Psych: appropriate mood and affect  MS: no gross musculoskeletal defects noted, no edema  Skin: erythematous plaques bilateral inguinal area appear to be more chronic appearing although the left inguinal area is still a little raised with some slight scale.  However underneath the testicular area or posterior testicle is still very brightly erythematous and intensely pruritic per patient      Diagnostic Test Results:  none      ASSESSMENT/PLAN:                                                        ICD-10-CM    1. Intertrigo L30.4 nystatin (MYCOSTATIN) cream     nystatin (MYCOSTATIN) 606790 UNIT/GM POWD   2. Hypertension goal BP (blood  pressure) < 140/90 I10      Already improving with the powder however still very itchy in the groin area  Cream will be more potent form than the powder  Cream for 2 weeks then powder to prevent.  Recommend follow up in clinic or dermatology if no relief , sooner if worse  Adverse reactions of medications discussed.  Over the counter medications discussed.   Aware to come back in if with worsening symptoms or if no relief despite treatment plan  Patient voiced understanding and had no further questions.     BP at goal. Doing well. Asymptomatic. Follow up with primary care provider recommended.  [facility]     MD Virgie Proctor MD  [facility]

## 2017-06-15 NOTE — MR AVS SNAPSHOT
"              After Visit Summary   6/15/2017    Jose Wynne    MRN: [id number]           Patient Information     Date Of Birth          1940        Visit Information        Provider Department      6/15/2017 6:55 PM Virgie Herrera MD [facility]        Today's Diagnoses     Intertrigo    -  1    Hypertension goal BP (blood pressure) < 140/90           Follow-ups after your visit        Who to contact     If you have questions or need follow up information about today's clinic visit or your schedule please contact [facility] directly at [phone].  Normal or non-critical lab and imaging results will be communicated to you by AirMediahart, letter or phone within 4 business days after the clinic has received the results. If you do not hear from us within 7 days, please contact the clinic through AirMediahart or phone. If you have a critical or abnormal lab result, we will notify you by phone as soon as possible.  Submit refill requests through Cloudian or call your pharmacy and they will forward the refill request to us. Please allow 3 business days for your refill to be completed.          Additional Information About Your Visit        MyChart Information     Cloudian lets you send messages to your doctor, view your test results, renew your prescriptions, schedule appointments and more. To sign up, go to [email or web address] . Click on \"Log in\" on the left side of the screen, which will take you to the Welcome page. Then click on \"Sign up Now\" on the right side of the page.     You will be asked to enter the access code listed below, as well as some personal information. Please follow the directions to create your username and password.     Your access code is: [email or web address]  Expires: 2017  5:45 PM     Your access code will  in 90 days. If you need help or a new code, please call your [facility] or [phone].        Care EveryWhere ID     This is your Care " EveryWhere ID. This could be used by other organizations to access your [place] medical records  [id number]        Your Vitals Were     Pulse Temperature Pulse Oximetry BMI (Body Mass Index)          60 98.1  F (36.7  C) (Tympanic) 97% 23.68 kg/m2         Blood Pressure from Last 3 Encounters:   06/15/17 122/70   02/17/17 106/66   10/26/16 124/70    Weight from Last 3 Encounters:   06/15/17 165 lb (74.8 kg)   05/22/17 160 lb (72.6 kg)   02/17/17 164 lb (74.4 kg)              Today, you had the following     No orders found for display         Today's Medication Changes          These changes are accurate as of: 6/15/17  7:28 PM.  If you have any questions, ask your nurse or doctor.               These medicines have changed or have updated prescriptions.        Dose/Directions    * nystatin 604373 UNIT/GM Powd   Commonly known as:  MYCOSTATIN   This may have changed:  Another medication with the same name was added. Make sure you understand how and when to take each.   Used for:  Candidiasis of skin   Changed by:  Fabiola Bernal PA-C        Apply topically 2 times daily Prevention of rash   Quantity:  60 g   Refills:  2       * nystatin cream   Commonly known as:  MYCOSTATIN   This may have changed:  You were already taking a medication with the same name, and this prescription was added. Make sure you understand how and when to take each.   Used for:  Intertrigo   Changed by:  Virgie Herrera MD        Apply topically 2 times daily for 14 days   Quantity:  250 g   Refills:  3       * nystatin 008518 UNIT/GM Powd   Commonly known as:  MYCOSTATIN   This may have changed:  You were already taking a medication with the same name, and this prescription was added. Make sure you understand how and when to take each.   Used for:  Intertrigo   Changed by:  Virgie Herrera MD        Apply topically 2 times daily Prevention of rash   Quantity:  1 Bottle   Refills:  3       * Notice:  This list  has 3 medication(s) that are the same as other medications prescribed for you. Read the directions carefully, and ask your doctor or other care provider to review them with you.         Where to get your medicines      These medications were sent to [facility] - [place], [place] - [address]  [address] [facility] [address] [facility] [address]     Phone:  [phone]     nystatin 975155 UNIT/GM Powd    nystatin cream                Primary Care Provider Office Phone # Fax #    [facility] [phone] [address] [facility] [address]        Thank you!     Thank you for choosing [facility]  for your care. Our goal is always to provide you with excellent care. Hearing back from our patients is one way we can continue to improve our services. Please take a few minutes to complete the written survey that you may receive in the mail after your visit with us. Thank you!             Your Updated Medication List - Protect others around you: Learn how to safely use, store and throw away your medicines at www.disposemymeds.org.          This list is accurate as of: 6/15/17  7:28 PM.  Always use your most recent med list.                   Brand Name Dispense Instructions for use    aspirin 81 MG tablet     90 tablet    Take 1 tablet by mouth daily.       losartan 100 MG tablet    COZAAR     Take 100 mg by mouth daily       metoprolol 25 MG 24 hr tablet    TOPROL-XL    45 tablet    Take 0.5 tablets by mouth daily.       * nystatin 238072 UNIT/GM Powd    MYCOSTATIN    60 g    Apply topically 2 times daily Prevention of rash       * nystatin cream    MYCOSTATIN    250 g    Apply topically 2 times daily for 14 days       * nystatin 021145 UNIT/GM Powd    MYCOSTATIN    1 Bottle    Apply topically 2 times daily Prevention of rash       simvastatin 10 MG tablet    ZOCOR    90 tablet    Take 1 tablet by mouth At Bedtime.       * Notice:  This list has 3 medication(s) that are  the same as other medications prescribed for you. Read the directions carefully, and ask your doctor or other care provider to review them with you.

## 2017-06-16 NOTE — NURSING NOTE
"Chief Complaint   Patient presents with     Derm Problem       Initial /70  Pulse 60  Temp 98.1  F (36.7  C) (Tympanic)  Wt 165 lb (74.8 kg)  SpO2 97%  BMI 23.68 kg/m2 Estimated body mass index is 23.68 kg/(m^2) as calculated from the following:    Height as of 5/14/16: 5' 10\" (1.778 m).    Weight as of this encounter: 165 lb (74.8 kg).  Medication Reconciliation: complete    Thania Trevino MA    "

## 2017-06-30 ENCOUNTER — OFFICE VISIT (OUTPATIENT)
Dept: URGENT CARE | Facility: URGENT CARE | Age: 77
End: 2017-06-30
Payer: COMMERCIAL

## 2017-06-30 VITALS
DIASTOLIC BLOOD PRESSURE: 78 MMHG | SYSTOLIC BLOOD PRESSURE: 141 MMHG | TEMPERATURE: 97.1 F | WEIGHT: 169 LBS | BODY MASS INDEX: 24.25 KG/M2 | HEART RATE: 96 BPM

## 2017-06-30 DIAGNOSIS — R21 RASH: Primary | ICD-10-CM

## 2017-06-30 DIAGNOSIS — R21 RASH OF GROIN: ICD-10-CM

## 2017-06-30 PROCEDURE — 99213 OFFICE O/P EST LOW 20 MIN: CPT | Performed by: FAMILY MEDICINE

## 2017-06-30 RX ORDER — NYSTATIN 100000 U/G
CREAM TOPICAL 2 TIMES DAILY
COMMUNITY
End: 2017-07-01

## 2017-06-30 RX ORDER — TRIAMCINOLONE ACETONIDE 1 MG/G
CREAM TOPICAL
Qty: 80 G | Refills: 0 | Status: SHIPPED | OUTPATIENT
Start: 2017-06-30 | End: 2017-07-19

## 2017-06-30 RX ORDER — MUPIROCIN 20 MG/G
OINTMENT TOPICAL 3 TIMES DAILY
COMMUNITY
End: 2017-07-01

## 2017-06-30 NOTE — MR AVS SNAPSHOT
After Visit Summary   6/30/2017    Jose Wynne    MRN: [id number]           Patient Information     Date Of Birth          1940        Visit Information        Provider Department      6/30/2017 6:00 PM Mikel Salgado MD [facility]        Today's Diagnoses     Rash    -  1    Rash of groin           Follow-ups after your visit        Additional Services     DERMATOLOGY REFERRAL       Your provider has referred you to: FMG: [facility] [place] [phone]   [email or web address]  UMP: [facility] [place] [phone]   [email or web address]    Please be aware that coverage of these services is subject to the terms and limitations of your health insurance plan.  Call member services at your health plan with any benefit or coverage questions.      Please bring the following with you to your appointment:    (1) Any X-Rays, CTs or MRIs which have been performed.  Contact the facility where they were done to arrange for  prior to your scheduled appointment.    (2) List of current medications  (3) This referral request   (4) Any documents/labs given to you for this referral                  Who to contact     If you have questions or need follow up information about today's clinic visit or your schedule please contact [facility] directly at [phone].  Normal or non-critical lab and imaging results will be communicated to you by MyChart, letter or phone within 4 business days after the clinic has received the results. If you do not hear from us within 7 days, please contact the clinic through MyChart or phone. If you have a critical or abnormal lab result, we will notify you by phone as soon as possible.  Submit refill requests through SensorLogic or call your pharmacy and they will forward the refill request to us. Please allow 3 business days for  "your refill to be completed.          Additional Information About Your Visit        [facility] Information     Staxxon lets you send messages to your doctor, view your test results, renew your prescriptions, schedule appointments and more. To sign up, go to [email or web address] . Click on \"Log in\" on the left side of the screen, which will take you to the Welcome page. Then click on \"Sign up Now\" on the right side of the page.     You will be asked to enter the access code listed below, as well as some personal information. Please follow the directions to create your username and password.     Your access code is: [email or web address]  Expires: 2017  5:45 PM     Your access code will  in 90 days. If you need help or a new code, please call your [facility] clinic or [phone].        Care EveryWhere ID     This is your Care EveryWhere ID. This could be used by other organizations to access your [facility] medical records  [id number]        Your Vitals Were     Pulse Temperature BMI (Body Mass Index)             96 97.1  F (36.2  C) (Oral) 24.25 kg/m2          Blood Pressure from Last 3 Encounters:   17 141/78   06/15/17 122/70   17 106/66    Weight from Last 3 Encounters:   17 169 lb (76.7 kg)   06/15/17 165 lb (74.8 kg)   17 160 lb (72.6 kg)              We Performed the Following     DERMATOLOGY REFERRAL          Today's Medication Changes          These changes are accurate as of: 17  6:29 PM.  If you have any questions, ask your nurse or doctor.               Start taking these medicines.        Dose/Directions    triamcinolone 0.1 % cream   Commonly known as:  KENALOG   Used for:  Rash   Started by:  Mikel Salgado MD        Apply sparingly to affected area three times daily as needed   Quantity:  80 g   Refills:  0            Where to get your medicines      These medications were sent to [facility] [address] [place] [address] -  [facility]  [address] [facility], " [facility] [address]     Phone:  [phone]     triamcinolone 0.1 % cream                Primary Care Provider Office Phone # Fax #    [facility] [phone] [address] [facility] [address]        Equal Access to Services     KAT DE LA CRUZ : Hadii aad ku hadasho Soomaali, waaxda luqadaha, qaybta kaalmada adeegyada, waxay idiin haykotan adedavid hernandez lalauren watts. So [facility] [phone].    ATENCIÓN: Si habla español, tiene a rosenthal disposición servicios gratuitos de asistencia lingüística. Maude al [phone].    We comply with applicable federal civil rights laws and [place] laws. We do not discriminate on the basis of race, color, national origin, age, disability sex, sexual orientation or gender identity.            Thank you!     Thank you for choosing [facility]  for your care. Our goal is always to provide you with excellent care. Hearing back from our patients is one way we can continue to improve our services. Please take a few minutes to complete the written survey that you may receive in the mail after your visit with us. Thank you!             Your Updated Medication List - Protect others around you: Learn how to safely use, store and throw away your medicines at www.disposemymeds.org.          This list is accurate as of: 6/30/17  6:29 PM.  Always use your most recent med list.                   Brand Name Dispense Instructions for use Diagnosis    aspirin 81 MG tablet     90 tablet    Take 1 tablet by mouth daily.    CAD (coronary artery disease)       losartan 100 MG tablet    COZAAR     Take 100 mg by mouth daily        metoprolol 25 MG 24 hr tablet    TOPROL-XL    45 tablet    Take 0.5 tablets by mouth daily.    Frequent PVCs       mupirocin 2 % ointment    BACTROBAN     Apply topically 3 times daily        * nystatin cream    MYCOSTATIN     Apply topically 2 times daily        * nystatin 502256 UNIT/GM Powd    MYCOSTATIN    1 Bottle    Apply topically 2  times daily Prevention of rash    Intertrigo       simvastatin 10 MG tablet    ZOCOR    90 tablet    Take 1 tablet by mouth At Bedtime.    High cholesterol       triamcinolone 0.1 % cream    KENALOG    80 g    Apply sparingly to affected area three times daily as needed    Rash       * Notice:  This list has 2 medication(s) that are the same as other medications prescribed for you. Read the directions carefully, and ask your doctor or other care provider to review them with you.

## 2017-06-30 NOTE — PROGRESS NOTES
SUBJECTIVE:                                                    Jose Wynne is a 76 year old male who presents to clinic today for the following health issues:      Rash      Duration: over a month    Description  Location: privet    Itching: no    Intensity:  none    Accompanying signs and symptoms: burning over both scrotal skin    History (similar episodes/previous evaluation): None    Precipitating or alleviating factors:  New exposures:medication nystatin cream  Recent travel: no      Therapies tried and outcome: bacitracin, mupirocin, nystatin ointment and then nystatin powder        Problem list and histories reviewed & adjusted, as indicated.  Additional history: as documented    Patient Active Problem List   Diagnosis     Advanced directives, counseling/discussion     Frequent PVCs     Hypertension goal BP (blood pressure) < 140/90     Hyperlipidemia LDL goal <160     HL (hearing loss)     CAD (coronary artery disease)     Common wart     Past Surgical History:   Procedure Laterality Date     C OPEN RX ANKLE DISLOCATN+FIXATN       HERNIORRHAPHY INGUINAL  2008       Social History   Substance Use Topics     Smoking status: Never Smoker     Smokeless tobacco: Never Used      Comment: never smoker; non-smoking household     Alcohol use No      Comment: none     Family History   Problem Relation Age of Onset     Breast Cancer Mother      CANCER Sister      liver and stomach     Neurologic Disorder Son      epilepsy-controlled with medication     HEART DISEASE No family hx of      Lipids No family hx of      DIABETES No family hx of      Hypertension No family hx of      Prostate Cancer No family hx of          Current Outpatient Prescriptions   Medication Sig Dispense Refill     mupirocin (BACTROBAN) 2 % ointment Apply topically 3 times daily       nystatin (MYCOSTATIN) cream Apply topically 2 times daily       triamcinolone (KENALOG) 0.1 % cream Apply sparingly to affected area three times daily as  needed 80 g 0     nystatin (MYCOSTATIN) 437575 UNIT/GM POWD Apply topically 2 times daily Prevention of rash 1 Bottle 3     losartan (COZAAR) 100 MG tablet Take 100 mg by mouth daily       simvastatin (ZOCOR) 10 MG tablet Take 1 tablet by mouth At Bedtime. 90 tablet 1     metoprolol (TOPROL-XL) 25 MG 24 hr tablet Take 0.5 tablets by mouth daily. 45 tablet 1     aspirin 81 MG tablet Take 1 tablet by mouth daily. 90 tablet 3     No Known Allergies  Recent Labs   Lab Test 07/19/13 03/22/13   0943  12/05/12   0959  10/29/12   1004   LDL  78  104  157*   --    HDL  32  35*  36*   --    TRIG  102  61  102   --    ALT  21   --    --    --    CR  0.9   --   0.86  0.82   GFRESTIMATED  >60   --   87  >90   GFRESTBLACK   --    --   >90  >90   POTASSIUM  4.2   --   4.2  4.2   TSH   --    --    --   1.75      BP Readings from Last 3 Encounters:   06/30/17 141/78   06/15/17 122/70   02/17/17 106/66    Wt Readings from Last 3 Encounters:   06/30/17 169 lb (76.7 kg)   06/15/17 165 lb (74.8 kg)   05/22/17 160 lb (72.6 kg)                  Labs reviewed in EPIC    ROS:  Constitutional, HEENT, cardiovascular, pulmonary, gi and gu systems are negative, except as otherwise noted.    OBJECTIVE:     /78  Pulse 96  Temp 97.1  F (36.2  C) (Oral)  Wt 169 lb (76.7 kg)  BMI 24.25 kg/m2  Body mass index is 24.25 kg/(m^2).  GENERAL: healthy, alert and no distress  NECK: no adenopathy, no asymmetry, masses, or scars and thyroid normal to palpation  RESP: lungs clear to auscultation - no rales, rhonchi or wheezes  CV: regular rate and rhythm, normal S1 S2, no S3 or S4, no murmur, click or rub, no peripheral edema and peripheral pulses strong  ABDOMEN: soft, nontender, no hepatosplenomegaly, no masses and bowel sounds normal  MS: no gross musculoskeletal defects noted, no edema  SKIN: dry macular erythematous rash involving inguinal area and scrotal skin, no drainage, blistering or satellite lesions noted       ASSESSMENT/PLAN:            ICD-10-CM    1. Rash R21 triamcinolone (KENALOG) 0.1 % cream   2. Rash of groin R21 DERMATOLOGY REFERRAL     76 year old male presents with inguinal rash, have been treated with multiple topical medications, only nystatin powder helped him somewhat. Suspect dermatitis along with fungal infection. Suggested to use kenalog and nystatin powder and keep the area dry and clean. Follow up with dermatology if symptoms persist or worsen. All questions answered.       MEDICATIONS:   Orders Placed This Encounter   Medications     DISCONTD: mupirocin (BACTROBAN) 2 % ointment     Sig: Apply topically 3 times daily     DISCONTD: nystatin (MYCOSTATIN) cream     Sig: Apply topically 2 times daily     triamcinolone (KENALOG) 0.1 % cream     Sig: Apply sparingly to affected area three times daily as needed     Dispense:  80 g     Refill:  0       Mikel Salgado MD  [facility]

## 2017-06-30 NOTE — NURSING NOTE
"Chief Complaint   Patient presents with     Derm Problem     medication not working       Initial /78  Pulse 96  Temp 97.1  F (36.2  C) (Oral)  Wt 169 lb (76.7 kg)  BMI 24.25 kg/m2 Estimated body mass index is 24.25 kg/(m^2) as calculated from the following:    Height as of 5/14/16: 5' 10\" (1.778 m).    Weight as of this encounter: 169 lb (76.7 kg).  Medication Reconciliation: complete   Adama Streeter      "

## 2017-07-19 DIAGNOSIS — R21 RASH: ICD-10-CM

## 2017-07-20 RX ORDER — TRIAMCINOLONE ACETONIDE 1 MG/G
CREAM TOPICAL
Qty: 45 G | Refills: 1 | Status: SHIPPED | OUTPATIENT
Start: 2017-07-20

## 2017-07-20 NOTE — TELEPHONE ENCOUNTER
Per 06-30-17 OV-          ICD-10-CM     1. Rash R21 triamcinolone (KENALOG) 0.1 % cream   2. Rash of groin R21 DERMATOLOGY REFERRAL      76 year old male presents with inguinal rash, have been treated with multiple topical medications, only nystatin powder helped him somewhat. Suspect dermatitis along with fungal infection. Suggested to use kenalog and nystatin powder and keep the area dry and clean. Follow up with dermatology if symptoms persist or worsen. All questions answered.        Attempt to call pt re: schristin, JOSE LUIS box not set up.  Please advise ongoing refill.  DEBI Magaña RN

## 2017-07-24 ENCOUNTER — OFFICE VISIT (OUTPATIENT)
Dept: DERMATOLOGY | Facility: CLINIC | Age: 77
End: 2017-07-24
Payer: COMMERCIAL

## 2017-07-24 VITALS — HEART RATE: 73 BPM | OXYGEN SATURATION: 98 % | DIASTOLIC BLOOD PRESSURE: 80 MMHG | SYSTOLIC BLOOD PRESSURE: 142 MMHG

## 2017-07-24 DIAGNOSIS — L30.4 INTERTRIGO: Primary | ICD-10-CM

## 2017-07-24 PROCEDURE — 99202 OFFICE O/P NEW SF 15 MIN: CPT | Performed by: PHYSICIAN ASSISTANT

## 2017-07-24 RX ORDER — KETOCONAZOLE 20 MG/G
CREAM TOPICAL
Qty: 60 G | Refills: 3 | Status: SHIPPED | OUTPATIENT
Start: 2017-07-24

## 2017-07-24 NOTE — PROGRESS NOTES
HPI:   Jose Wynne is a 76 year old male who presents for evaluation of a persistent rash in the groin  chief complaint  Location: groin   Condition present for:  About 2 months.   Previous treatments include: nystatin powder - seems to help but burns. He has been putting on TAC cream with nystatin powder over    Review Of Systems  Eyes: negative  Ears/Nose/Throat: negative  Respiratory: No shortness of breath, dyspnea on exertion, cough, or hemoptysis  Cardiovascular: negative  Gastrointestinal: negative  Genitourinary: negative  Musculoskeletal: negative  Neurologic: negative  Psychiatric: negative        PHYSICAL EXAM:      Skin exam performed as follows: Type 2 skin. Mood appropriate  Alert and Oriented X 3. Well developed, well nourished in no distress.  General appearance: Normal  Head including face: Normal  Eyes: conjunctiva and lids: Normal  Mouth: Lips, teeth, gums: Normal  Neck: Normal  Chest-breast/axillae: Normal  Back: Normal  Spleen and liver: Normal  Cardiovascular: Exam of peripheral vascular system by observation for swelling, varicosities, edema: Normal  Genitalia: groin, buttocks: Normal  Extremities: digits/nails (clubbing): Normal  Eccrine and Apocrine glands: Normal  Right upper extremity: Normal  Left upper extremity: Normal  Right lower extremity: Normal  Left lower extremity: Normal  Skin: Scalp and body hair: See below    1. Well demarcated erythema and maceration in the groin    ASSESSMENT/PLAN:     Intertrigo in the groin. Advised on yeast overgrowth and potential for recurrence.  --Start ketoconazole cream BID (AM and mid day)  --Continue TAC cream QHS  --Do this for 1-2 weeks - can repeat if needed.         Follow-up: PRN  CC:   Scribed By: Kalee Ordonez, MS, PA-C

## 2017-07-24 NOTE — PATIENT INSTRUCTIONS
You have intertrigo - this is a buildup of yeast on the skin    --Start ketoconazole cream twice per day (AM and mid-day)  --Continue triamcinolone cream before bed    Do this for 1-2 weeks until better - can repeat if needed in the future.

## 2017-07-24 NOTE — NURSING NOTE
"Initial /80  Pulse 73  SpO2 98% Estimated body mass index is 24.25 kg/(m^2) as calculated from the following:    Height as of 5/14/16: 1.778 m (5' 10\").    Weight as of 6/30/17: 76.7 kg (169 lb). .      "

## 2017-07-24 NOTE — MR AVS SNAPSHOT
"              After Visit Summary   7/24/2017    Jose Wynne    MRN: [id number]           Patient Information     Date Of Birth          1940        Visit Information        Provider Department      7/24/2017 3:00 PM Kalee Ordonez PA-C [facility]        Today's Diagnoses     Intertrigo    -  1      Care Instructions    You have intertrigo - this is a buildup of yeast on the skin    --Start ketoconazole cream twice per day (AM and mid-day)  --Continue triamcinolone cream before bed    Do this for 1-2 weeks until better - can repeat if needed in the future.           Follow-ups after your visit        Who to contact     If you have questions or need follow up information about today's clinic visit or your schedule please contact [facility] directly at [phone].  Normal or non-critical lab and imaging results will be communicated to you by Salorixhart, letter or phone within 4 business days after the clinic has received the results. If you do not hear from us within 7 days, please contact the clinic through Salorixhart or phone. If you have a critical or abnormal lab result, we will notify you by phone as soon as possible.  Submit refill requests through Zarfo or call your pharmacy and they will forward the refill request to us. Please allow 3 business days for your refill to be completed.          Additional Information About Your Visit        MyChart Information     Zarfo lets you send messages to your doctor, view your test results, renew your prescriptions, schedule appointments and more. To sign up, go to [email or web address] . Click on \"Log in\" on the left side of the screen, which will take you to the Welcome page. Then click on \"Sign up Now\" on the right side of the page.     You will be asked to enter the access code listed below, as well as some personal information. Please follow the directions to create your username and password.     Your access code is: " [email or web address]  Expires: 2017  5:45 PM     Your access code will  in 90 days. If you need help or a new code, please call your [facility] or [phone].        Care EveryWhere ID     This is your Care EveryWhere ID. This could be used by other organizations to access your [place] medical records  [id number]        Your Vitals Were     Pulse Pulse Oximetry                73 98%           Blood Pressure from Last 3 Encounters:   17 142/80   17 141/78   06/15/17 122/70    Weight from Last 3 Encounters:   17 76.7 kg (169 lb)   06/15/17 74.8 kg (165 lb)   17 72.6 kg (160 lb)              Today, you had the following     No orders found for display         Today's Medication Changes          These changes are accurate as of: 17  3:12 PM.  If you have any questions, ask your nurse or doctor.               Start taking these medicines.        Dose/Directions    ketoconazole 2 % cream   Commonly known as:  NIZORAL   Used for:  Intertrigo   Started by:  Kalee Ordonez PA-C        Apply to AA BID   Quantity:  60 g   Refills:  3            Where to get your medicines      These medications were sent to [facility]  [place], [place] -  [facility]   [facility] [address]     Phone:  [phone]     ketoconazole 2 % cream                Primary Care Provider Office Phone # Fax #    [facility] [phone] [address] [facility] [facility] [address]        Equal Access to Services     [facility] : Hadii les joyner hadasho Sogreg, waaxda luqadaha, qaybta kaalmada cesar haro. So [facility] [phone].    ATENCIÓN: Si habla español, tiene a rosenthal disposición servicios gratuitos de asistencia lingüística. Llame al [phone].    We comply with applicable federal civil rights laws and [place] laws. We do not discriminate on the basis of race, color, national origin, age, disability sex,  sexual orientation or gender identity.            Thank you!     Thank you for choosing [facility]  for your care. Our goal is always to provide you with excellent care. Hearing back from our patients is one way we can continue to improve our services. Please take a few minutes to complete the written survey that you may receive in the mail after your visit with us. Thank you!             Your Updated Medication List - Protect others around you: Learn how to safely use, store and throw away your medicines at www.disposemymeds.org.          This list is accurate as of: 7/24/17  3:12 PM.  Always use your most recent med list.                   Brand Name Dispense Instructions for use Diagnosis    aspirin 81 MG tablet     90 tablet    Take 1 tablet by mouth daily.    CAD (coronary artery disease)       ketoconazole 2 % cream    NIZORAL    60 g    Apply to AA BID    Intertrigo       losartan 100 MG tablet    COZAAR     Take 100 mg by mouth daily        metoprolol 25 MG 24 hr tablet    TOPROL-XL    45 tablet    Take 0.5 tablets by mouth daily.    Frequent PVCs       nystatin 172213 UNIT/GM Powd    MYCOSTATIN    1 Bottle    Apply topically 2 times daily Prevention of rash    Intertrigo       simvastatin 10 MG tablet    ZOCOR    90 tablet    Take 1 tablet by mouth At Bedtime.    High cholesterol       triamcinolone 0.1 % cream    KENALOG    45 g    APPLY  CREAM TOPICALLY TO AFFECTED AREA THREE TIMES DAILY AS NEEDED    Rash

## 2017-08-01 ENCOUNTER — OFFICE VISIT (OUTPATIENT)
Dept: FAMILY MEDICINE | Facility: CLINIC | Age: 77
End: 2017-08-01
Payer: COMMERCIAL

## 2017-08-01 VITALS
DIASTOLIC BLOOD PRESSURE: 76 MMHG | SYSTOLIC BLOOD PRESSURE: 131 MMHG | OXYGEN SATURATION: 97 % | WEIGHT: 169 LBS | HEART RATE: 110 BPM | BODY MASS INDEX: 24.25 KG/M2

## 2017-08-01 DIAGNOSIS — I48.91 ATRIAL FIBRILLATION, UNSPECIFIED TYPE (H): Primary | ICD-10-CM

## 2017-08-01 DIAGNOSIS — I49.9 IRREGULAR HEART BEAT: ICD-10-CM

## 2017-08-01 PROCEDURE — 93000 ELECTROCARDIOGRAM COMPLETE: CPT | Performed by: FAMILY MEDICINE

## 2017-08-01 PROCEDURE — 99215 OFFICE O/P EST HI 40 MIN: CPT | Mod: 25 | Performed by: FAMILY MEDICINE

## 2017-08-01 NOTE — NURSING NOTE
"Chief Complaint   Patient presents with     Heart Problem       Initial /76  Pulse 110  Wt 169 lb (76.7 kg)  SpO2 97%  BMI 24.25 kg/m2 Estimated body mass index is 24.25 kg/(m^2) as calculated from the following:    Height as of 5/14/16: 5' 10\" (1.778 m).    Weight as of this encounter: 169 lb (76.7 kg).  Medication Reconciliation: complete  Kalee Ann , YINA     "

## 2017-08-01 NOTE — MR AVS SNAPSHOT
"              After Visit Summary   2017    Jose Wynne    MRN: [id number]           Patient Information     Date Of Birth          1940        Visit Information        Provider Department      2017 8:35 AM Allyson Loera MD [facility]        Today's Diagnoses     Atrial fibrillation, unspecified type (H)    -  1    Irregular heart beat           Follow-ups after your visit        Who to contact     If you have questions or need follow up information about today's clinic visit or your schedule please contact [facility] directly at [phone].  Normal or non-critical lab and imaging results will be communicated to you by [facility] Intelligence[employer], letter or phone within 4 business days after the clinic has received the results. If you do not hear from us within 7 days, please contact the clinic through [employer] or phone. If you have a critical or abnormal lab result, we will notify you by phone as soon as possible.  Submit refill requests through Echo it or call your pharmacy and they will forward the refill request to us. Please allow 3 business days for your refill to be completed.          Additional Information About Your Visit        MyChart Information     Echo it lets you send messages to your doctor, view your test results, renew your prescriptions, schedule appointments and more. To sign up, go to [email or web address] it . Click on \"Log in\" on the left side of the screen, which will take you to the Welcome page. Then click on \"Sign up Now\" on the right side of the page.     You will be asked to enter the access code listed below, as well as some personal information. Please follow the directions to create your username and password.     Your access code is: [email or web address]  Expires: 2017  5:45 PM     Your access code will  in 90 days. If you need help or a new code, please call your [facility] or [phone].        Care EveryWhere ID     This is your Care " EveryWhere ID. This could be used by other organizations to access your [place] medical records  [id number]        Your Vitals Were     Pulse Pulse Oximetry BMI (Body Mass Index)             110 97% 24.25 kg/m2          Blood Pressure from Last 3 Encounters:   08/01/17 131/76   07/24/17 142/80   06/30/17 141/78    Weight from Last 3 Encounters:   08/01/17 169 lb (76.7 kg)   06/30/17 169 lb (76.7 kg)   06/15/17 165 lb (74.8 kg)              We Performed the Following     EKG 12-lead complete w/read - Clinics        Primary Care Provider Office Phone # Fax #    [facility] [phone]       [address] [facility] [address]        Equal Access to Services     [facility] : Hadii les hoffmano Sogreg, waaxda luqadaha, qaybta kaalmada adeegyada, cesar campbell . So [facility] [phone].    ATENCIÓN: Si habla español, tiene a rosenthal disposición servicios gratuitos de asistencia lingüística. Llame al [phone].    We comply with applicable federal civil rights laws and [place] laws. We do not discriminate on the basis of race, color, national origin, age, disability sex, sexual orientation or gender identity.            Thank you!     Thank you for choosing [facility]  for your care. Our goal is always to provide you with excellent care. Hearing back from our patients is one way we can continue to improve our services. Please take a few minutes to complete the written survey that you may receive in the mail after your visit with us. Thank you!             Your Updated Medication List - Protect others around you: Learn how to safely use, store and throw away your medicines at www.disposemymeds.org.          This list is accurate as of: 8/1/17  9:32 AM.  Always use your most recent med list.                   Brand Name Dispense Instructions for use Diagnosis    aspirin 81 MG tablet     90 tablet    Take 1 tablet by mouth daily.    CAD (coronary  artery disease)       ketoconazole 2 % cream    NIZORAL    60 g    Apply to AA BID    Intertrigo       losartan 100 MG tablet    COZAAR     Take 100 mg by mouth daily        metoprolol 25 MG 24 hr tablet    TOPROL-XL    45 tablet    Take 0.5 tablets by mouth daily.    Frequent PVCs       nystatin 534703 UNIT/GM Powd    MYCOSTATIN    1 Bottle    Apply topically 2 times daily Prevention of rash    Intertrigo       simvastatin 10 MG tablet    ZOCOR    90 tablet    Take 1 tablet by mouth At Bedtime.    High cholesterol       triamcinolone 0.1 % cream    KENALOG    45 g    APPLY  CREAM TOPICALLY TO AFFECTED AREA THREE TIMES DAILY AS NEEDED    Rash

## 2017-08-01 NOTE — PROGRESS NOTES
SUBJECTIVE:                                                    Jose Wynne is a 76 year old male who presents to clinic today for the following health issues:      Irregular heart beat x  AM   Pt woke this am and felt fine, then later noted fast irregular heart beat.   Has never had this before.  Has h/o CAD, htn.  [facility]  On beta blocker toprol xl 25 mg daily.    Denies CP, shortness of breath, nausea, no edema or other symptoms.        Problem list and histories reviewed & adjusted, as indicated.  Additional history: as documented    Patient Active Problem List   Diagnosis     Advanced directives, counseling/discussion     Frequent PVCs     Hypertension goal BP (blood pressure) < 140/90     Hyperlipidemia LDL goal <160     HL (hearing loss)     CAD (coronary artery disease)     Common wart     Past Surgical History:   Procedure Laterality Date     C OPEN RX ANKLE DISLOCATN+FIXATN       HERNIORRHAPHY INGUINAL  2008       Social History   Substance Use Topics     Smoking status: Never Smoker     Smokeless tobacco: Never Used      Comment: never smoker; non-smoking household     Alcohol use No      Comment: none     Family History   Problem Relation Age of Onset     Breast Cancer Mother      CANCER Sister      liver and stomach     Neurologic Disorder Son      epilepsy-controlled with medication     HEART DISEASE No family hx of      Lipids No family hx of      DIABETES No family hx of      Hypertension No family hx of      Prostate Cancer No family hx of          Current Outpatient Prescriptions   Medication Sig Dispense Refill     ketoconazole (NIZORAL) 2 % cream Apply to AA BID 60 g 3     triamcinolone (KENALOG) 0.1 % cream APPLY  CREAM TOPICALLY TO AFFECTED AREA THREE TIMES DAILY AS NEEDED 45 g 1     nystatin (MYCOSTATIN) 758502 UNIT/GM POWD Apply topically 2 times daily Prevention of rash 1 Bottle 3     losartan (COZAAR) 100 MG tablet Take 100 mg by mouth daily       simvastatin  (ZOCOR) 10 MG tablet Take 1 tablet by mouth At Bedtime. 90 tablet 1     metoprolol (TOPROL-XL) 25 MG 24 hr tablet Take 0.5 tablets by mouth daily. 45 tablet 1     aspirin 81 MG tablet Take 1 tablet by mouth daily. 90 tablet 3     BP Readings from Last 3 Encounters:   08/01/17 131/76   07/24/17 142/80   06/30/17 141/78    Wt Readings from Last 3 Encounters:   08/01/17 169 lb (76.7 kg)   06/30/17 169 lb (76.7 kg)   06/15/17 165 lb (74.8 kg)                  Labs reviewed in EPIC        Reviewed and updated as needed this visit by clinical staffTobacco  Allergies  Meds  Problems       Reviewed and updated as needed this visit by Provider  Allergies  Meds  Problems         ROS:  Constitutional, HEENT, cardiovascular, pulmonary, gi and gu systems are negative, except as otherwise noted.      OBJECTIVE:   /76  Pulse 110  Wt 169 lb (76.7 kg)  SpO2 97%  BMI 24.25 kg/m2  Body mass index is 24.25 kg/(m^2).  GENERAL: healthy, alert and no distress  EYES: Eyes grossly normal to inspection, PERRL and conjunctivae and sclerae normal  NECK: no adenopathy, no asymmetry, masses, or scars and thyroid normal to palpation  RESP: lungs clear to auscultation - no rales, rhonchi or wheezes  CV: tachycardia, irregularly irregular rhythm, normal S1 S2, no S3 or S4, no murmur, click or rub, peripheral pulses strong and no peripheral edema  MS: no gross musculoskeletal defects noted, no edema  SKIN: no suspicious lesions or rashes  PSYCH: mentation appears normal, affect normal/bright    Diagnostic Test Results:  EKG - change from 2016, Afib with rvr.    ASSESSMENT/PLAN:     (I48.91) Atrial fibrillation, unspecified type (H)  (primary encounter diagnosis)  (I49.9) Irregular heart beat  Comment: new onset  Plan: EKG 12-lead complete w/read - Clinics        Pt is clear when this started which opens the door for cardioversion  Discussed this and that while he is stable and does not require EMS transport, needs someone to drive  him to ED at [facility].  On low dose beta blocker, likely needs that increased but will leave that to ED to adjust after possible cardioversion.    Contacted [facility] ED, spoke with RN Ursula.  Report given and pt will go to ED.    Level of medical decision making: HIGH Allyson Loera MD  [facility]

## 2018-11-08 ENCOUNTER — TELEPHONE (OUTPATIENT)
Dept: FAMILY MEDICINE | Facility: CLINIC | Age: 78
End: 2018-11-08

## 2018-11-08 NOTE — TELEPHONE ENCOUNTER
11/8/2018    Call Regarding Preventive Health Screening Annual wellness    Attempt 1    Message VM not setup     Comments:       Outreach   CC

## 2018-11-10 NOTE — TELEPHONE ENCOUNTER
11/10/2018    Call Regarding Preventive Health Screening Annual Screening     Attempt 2    Message VM NOT SETUP    Comments:       Outreach   CC

## 2018-11-15 NOTE — TELEPHONE ENCOUNTER
11/15/2018    Call Regarding Preventative Health Screening Annual Wellness    Attempt 3    Message no vm    Comments:       Outreach   SV    ;